# Patient Record
Sex: FEMALE | Race: WHITE
[De-identification: names, ages, dates, MRNs, and addresses within clinical notes are randomized per-mention and may not be internally consistent; named-entity substitution may affect disease eponyms.]

---

## 2019-09-03 ENCOUNTER — HOSPITAL ENCOUNTER (EMERGENCY)
Dept: HOSPITAL 61 - ER | Age: 34
Discharge: HOME | End: 2019-09-03
Payer: SELF-PAY

## 2019-09-03 VITALS — DIASTOLIC BLOOD PRESSURE: 60 MMHG | SYSTOLIC BLOOD PRESSURE: 143 MMHG

## 2019-09-03 VITALS — BODY MASS INDEX: 33.8 KG/M2 | HEIGHT: 64 IN | WEIGHT: 198 LBS

## 2019-09-03 DIAGNOSIS — M54.42: Primary | ICD-10-CM

## 2019-09-03 DIAGNOSIS — Z88.1: ICD-10-CM

## 2019-09-03 PROCEDURE — 99283 EMERGENCY DEPT VISIT LOW MDM: CPT

## 2019-09-03 PROCEDURE — 96372 THER/PROPH/DIAG INJ SC/IM: CPT

## 2019-09-03 NOTE — PHYS DOC
Adult General


Chief Complaint


Chief Complaint:  LOWER BACK PAIN OR INJURY





HPI


HPI





Patient is a 34  year old female who was in the shower this morning and states 

she started having severe lower back pain that radiated down her leg. The 

patient rates the pain as 10 out of 10 in severity and sharp. Took 800 mg 

ibuprofen prior to arrival. 


 (ZEKE RAMOS)





Review of Systems


Review of Systems





Constitutional: Denies fever or chills []


Eyes: Denies change in visual acuity, redness, or eye pain []


HENT: Denies nasal congestion or sore throat []


Respiratory: Denies cough or shortness of breath []


Cardiovascular: No additional information not addressed in HPI []


GI: Denies abdominal pain, nausea, vomiting, bloody stools or diarrhea []


: Denies dysuria or hematuria []


Musculoskeletal: Reports back pain


Integument: Denies rash or skin lesions []


Neurologic: Denies headache, focal weakness or sensory changes []


Endocrine: Denies polyuria or polydipsia []





Complete systems were reviewed and found to be within normal limits, except as 

documented in this note.


 (ZEKE RAMOS)





Current Medications


Current Medications





Current Medications








 Medications


  (Trade)  Dose


 Ordered  Sig/Yasmin  Start Time


 Stop Time Status Last Admin


Dose Admin


 


 Ketorolac


 Tromethamine


  (Toradol 30mg


 Vial)  30 mg  1X  STAT  9/3/19 09:44


 9/3/19 09:45 DC  





 


 Orphenadrine


 Citrate


  (Norflex)  60 mg  1X  ONCE  9/3/19 10:15


 9/3/19 10:16 DC 9/3/19 10:06


60 MG





 (TAMMY AMAYA MD)





Allergies


Allergies





Allergies








Coded Allergies Type Severity Reaction Last Updated Verified


 


  amoxicillin Allergy Severe Hives 9/3/19 Yes





 (TAMMY AMAYA MD)





Physical Exam


Physical Exam





Constitutional: Well developed, well nourished, no acute distress, non-toxic 

appearance. []


HENT: Normocephalic, atraumatic, bilateral external ears normal, oropharynx 

moist, no oral exudates, nose normal. []


Eyes: PERRLA, EOMI, conjunctiva normal, no discharge. [] 


Neck: Normal range of motion, no tenderness, supple, no stridor. [] 


Cardiovascular:Heart rate regular rhythm, no murmur []


Lungs & Thorax:  Bilateral breath sounds clear to auscultation []


Abdomen: Bowel sounds normal, soft, no tenderness, no masses, no pulsatile 

masses. [] 


Skin: Warm, dry, no erythema, no rash. [] 


Back: Tenderness on palpation to left lower back.


Extremities: No tenderness, no cyanosis, no clubbing, ROM intact, no edema. [] 


Neurologic: Alert and oriented X 3, normal motor function, normal sensory 

function, no focal deficits noted. []


Psychologic: Affect normal, judgement normal, mood normal. []


 (ZEKE RAMOS)





Current Patient Data


Vital Signs





                                   Vital Signs








  Date Time  Temp Pulse Resp B/P (MAP) Pulse Ox O2 Delivery O2 Flow Rate FiO2


 


9/3/19 09:39 98.7 93 16 143/60 (87) 98 Room Air  





 98.7       





 (TAMMY AMAYA MD)





EKG


EKG


[]


 (ZEKE RAMOS)





Radiology/Procedures


Radiology/Procedures


[]


 (ZEKE RAMOS)





Course & Med Decision Making


Course & Med Decision Making


Pertinent Labs and Imaging studies reviewed. (See chart for details)





Will give Norflex in ER and send home with script. Also will write for 400 mg of

 ibuprofen every 6 hours. Discussed with patient using nonpharmacological 

methods such as heat patches and foam roller.


 (ZEKE RAMOS)


Course & Med Decision Making





Staff Physician Addendum:


I was working in the ER during the course of this patient's visit.  I was 

available for consultation as needed, but I was not directly involved in the 

care of this patient.    


 (TAMMY AMAYA MD)


Dragon Disclaimer


Dragon Disclaimer


This electronic medical record was generated, in whole or in part, using a voice

 recognition dictation system.


 (ZEKE RAMOS)





Departure


Departure


Impression:  


   Primary Impression:  


   Back pain


Disposition:  01 HOME, SELF-CARE


Condition:  STABLE


Referrals:  


UNKNOWN PCP NAME (PCP)


Patient Instructions:  Back Pain, Adult, Sciatica





Additional Instructions:  


Thank you for visiting [facility]. We appreciate you trusting us 

with your care. If any additional problems come up don't hesitate to return to 

visit us. Please follow up with your primary care provider so they can plan 

additional care if needed and know about the problem that you had. If symptoms 

worsen come back to the Emergency Department. Any concerning symptoms that start

 such as chest pain, shortness of air, weakness or numbness on one side of the 

body, running high fevers or any other concerning symptoms return to the ER.





Please fill your medications at any pharmacy and follow the prescription 

instructions.


Scripts


Ibuprofen (IBUPROFEN) 400 Mg Tablet


400 MG PO PRN Q6HRS PRN for INFLAMMATION for 5 Days, TAB


   Prov: ZEKE RAMOS         9/3/19 


Orphenadrine Citrate (ORPHENADRINE CITRATE) 100 Mg Tablet.er


1 TAB PO BID, #20 TAB


   Prov: ZEKE RAMOS         9/3/19





Problem Qualifiers








   Primary Impression:  


   Back pain


   Back pain location:  low back pain  Chronicity:  acute  Back pain laterality:

     left  Sciatica presence:  with sciatica  Sciatica laterality:  sciatica of 

   left side  Qualified Codes:  M54.42 - Lumbago with sciatica, left side








ZEKE RAMOS           Sep 3, 2019 09:49


TAMMY AMAYA MD             Sep 6, 2019 08:24